# Patient Record
(demographics unavailable — no encounter records)

---

## 2024-12-30 NOTE — ASSESSMENT
[FreeTextEntry1] : ROCHELLE HUTCHINSON is a 74 year old M who presents today Dec 30, 2024 with the above history and the following active issues:  HTN reviewed goal <130/80 Cont coreg and ramipril keep home log, consider addition of thiazide if needed low salt diet   Cardiomyopathy, resolved on med rx for AF EMELINA/RVE Prior study TAA 4.3cm currently wnl  Valvular disease, mild. Beta blocker, ACE inhibitor Monitor thyroid replacement. Treat sleep apnea.  Atrial fibrillation, permanent PVCs, asx Normal EF Reviewed monitors Continue beta blocker for rate control Continue anticoagulation. Monitor hemoglobin and renal function. Reviewed bleeding precautions. Treatment of sleep apnea. Monitor thyroid replacement.  Atherosclerosis. Continue statin therapy. No adverse effects.  Syncope, vasovagal, recurrent Prev, in the setting of COVID, note abnl EKG, repeat showed normal QTc  No episodes recently, discussed avoiding triggers and staying hydrated  Reviewed the pathophysiology of vasovagal syncope. Instructed to avoid triggers such as prolonged standing, heat exposure, alcohol or dehydration and to assume a supine position with legs raised at the onset of symptoms to avoid a traumatic fall. Educated on counterpressure maneuvers such as tensing the arms with clenched fists to reduce lower extremity venous pooling and potentially abort a syncopal episode.  WIll call with results of routine labs requested.   F/U with our office in 6 months for routine cardiovascular care unless otherwise indicated.  Discussed red flag symptoms, which would warrant sooner or emergent medical evaluation. Any questions and concerns were addressed and resolved.   Sincerely,   JOSE M Turner Patients history, testing, medications, and any relative changes to plan of care reviewed with supervising MD: Dr. Octavio Mcclain

## 2024-12-30 NOTE — HISTORY OF PRESENT ILLNESS
[FreeTextEntry1] : ROCHELLE HUTCHINSON  is a 74 year old  M  AF, CMP, HTN, GREER, HL, atherosclerosis, VHD, hypothyroid, syncope History of atrial fibrillation, on Eliquis There is concomitant history of sleep apnea (uses CPAP), hyperlipidemia and hypertension.  Prior cardiomyopathy, possibly rate related.  With up titration of beta-blocker as well as the addition of ACE inhibitor, improvement in LV function.   There is no prior history of a clinical myocardial infarction, coronary revascularization.  There is no history of symptomatic congestive heart failure  Prior episode of vasovagal. He reports feeling unwell p meditation class.  EMS was called. His blood pressure was low. He had sweats and near-syncope.  There was subsequent brisk improvement. He did not go to the emergency room.  then another episode of vasovagal syncope. He was at [employer], tripped and cut his arm, then had meditation class, went to clean his wound which was very bloody then fainted.  EMS called he was discharged from [facility] with no further recommendations.  He then called March 2024 with syncope (again after meditating) and /110 with altered speech. He was referred to ER. Records show stable labs. EKG AF with QTc 583 msec. He was dx with COVID. He was discharged with no changes. Lab k 3.6, cr 0.8, TFT wnl, hg 15.8   No recurrent events since then. He reports feeling well lately.  There is minor dyspnea on exertion. He walks 3 miles a few times per week.  There is no exertional chest pain, pressure or discomfort.  There is no orthopnea.  There are no symptomatic palpitations He is not aware of his AF.  No bleeding issues.  he has been busy with work.  He reports improvement in his stress as his housing situation has settled.  [facility].   BP has improved.  He is seeing endocrine regarding his parathyroid.   Echo 11/2024 E 55% mild LVH, mild MR, RVE, MARZENA seen on prior, aorta 3.9cm Monitor chronic AF avf 80s. Episodes of NSVT up to 4 beats. PVCs  ~ 4-6%. Two monitors worn, the second with 8 beats labeled NSVT appears to be AF with aberrancy on my review.  EKG today 7/8/24 AF LAFB unchanged, and QTcB 442/QTcH 415msec Labs 2/26/24 showed elevated IPTH pt was referred to endocrinology, states also had seen nephro in past unremarkable , , hg 15.3, k 4.1, cr 0.9, LDL 80, a1c 5.3, normal ionized calcium Echo Sept 2023 LVEF 60-65% mild LVH/MR, ao 4.3cm, LAE Nuclear stress test September 2022 mild fixed apical defect suggestive of infarct.  Attenuation artifacts are noted. Abd September 2022 mild atherosclerosis no aneurysm Carotid Doppler September 2022 mild nonobstructive atherosclerosis bilaterally no significant change

## 2025-06-19 NOTE — ADDENDUM
[FreeTextEntry1] : Please note the patient was reviewed with NP Kimberlyn Bello. I was physically present during the service of the patient. I was directly involved in the management plan and recommendations of the care provided to the patient. I personally reviewed the history and physical examination as documented by the NP above.

## 2025-06-19 NOTE — ASSESSMENT
[FreeTextEntry1] : ROCHELLE HUTCHINSON is a 74 year old M who presents today Jun 18, 2025 with the above history and the following active issues:  JARQUIN BLLE edema Recommend addition of diuretic, hctz 25mg in AM for BP and symptoms Reduce salt intake Followup labs including BMP Monitor placed today to evaluate rate control and PVC burden  Obtain 2d echocardiogram to evaluate resting heart structure, valvular function, and LVEF. Obtain a nuclear stress test to evaluate for evidence of myocardial ischemia. Will need to stay on BBl for testing for rate control likely needing a lexiscan study.  Followup after testing  HTN reviewed goal <130/80 Cont coreg and ramipril + thiazide low salt diet   Cardiomyopathy, resolved on med rx for AF EMELINA/RVE Prior study TAA 4.3cm currently wnl  Valvular disease, mild. Beta blocker, ACE inhibitor Monitor thyroid replacement. Treat sleep apnea.  Atrial fibrillation, permanent PVCs Normal EF Reviewed monitors Continue beta blocker for rate control Continue anticoagulation. Monitor hemoglobin and renal function. Reviewed bleeding precautions. Treatment of sleep apnea. Monitor thyroid replacement.  Atherosclerosis. Continue statin therapy. No adverse effects.  Syncope, vasovagal, recurrent Prev, in the setting of COVID, note abnl EKG, repeat showed normal QTc  No episodes recently, discussed avoiding triggers and staying hydrated  Reviewed the pathophysiology of vasovagal syncope. Instructed to avoid triggers such as prolonged standing, heat exposure, alcohol or dehydration and to assume a supine position with legs raised at the onset of symptoms to avoid a traumatic fall. Educated on counterpressure maneuvers such as tensing the arms with clenched fists to reduce lower extremity venous pooling and potentially abort a syncopal episode.  Sincerely,   JOSE M Turner Patient's history, testing, medications, and any relative changes to plan of care reviewed with supervising MD: Dr. Manuelito Rubin

## 2025-06-19 NOTE — HISTORY OF PRESENT ILLNESS
[FreeTextEntry1] : ROCHELLE HUTCHINSON  is a 74 year old  M  AF, CMP, HTN, GREER, HL, atherosclerosis, VHD, hypothyroid, syncope History of atrial fibrillation, on Eliquis There is concomitant history of sleep apnea (uses CPAP), hyperlipidemia and hypertension.  Prior cardiomyopathy, possibly rate related.  With up titration of beta-blocker as well as the addition of ACE inhibitor, improvement in LV function.   There is no prior history of a clinical myocardial infarction, coronary revascularization.  There is no history of symptomatic congestive heart failure  Prior episode of vasovagal. He reports feeling unwell p meditation class.  EMS was called. His blood pressure was low. He had sweats and near-syncope.  There was subsequent brisk improvement. He did not go to the emergency room.  then another episode of vasovagal syncope. He was at [employer], tripped and cut his arm, then had meditation class, went to clean his wound which was very bloody then fainted.  EMS called he was discharged from [facility] with no further recommendations.  He then called March 2024 with syncope (again after meditating) and /110 with altered speech. He was referred to ER. Records show stable labs. EKG AF with QTc 583 msec. He was dx with COVID. He was discharged with no changes. Lab k 3.6, cr 0.8, TFT wnl, hg 15.8   No recurrent events since then.  He walks 3 miles a few times per week.  There is no exertional chest pain, pressure or discomfort.  There is no orthopnea.  There are no symptomatic palpitations He is not aware of his AF.  No bleeding issues.  he has been busy with work.  He reports improvement in his stress as his housing situation has settled.  He plans to remain on the [facility].   He is seeing endocrine regarding his parathyroid. This is being monitored.   Last seen Dec 2024. Interim over the past few months has noticed an increased in JARQUIN with climbing stairs. There is also BLLE edema. He admits to adding table salt to his food at home. His BP is also elevated.  EKG today 6/18/25 shows  bpm, PVCs, LAFB / Lab 12/31/24  hg 16, k 4.1, cr 0.9, ca 10.2, LDL 92, a1c 5.6,   Echo 11/2024 E 55% mild LVH, mild MR, RVE, MARZENA seen on prior, aorta 3.9cm Monitor chronic AF av 80s. Episodes of NSVT up to 4 beats. PVCs  ~ 4-6%. Two monitors worn, the second with 8 beats labeled NSVT appears to be AF with aberrancy on my review.  EKG today 7/8/24 AF LAFB unchanged, and QTcB 442/QTcH 415msec Labs 2/26/24 showed elevated IPTH pt was referred to endocrinology, states also had seen nephro in past unremarkable , , hg 15.3, k 4.1, cr 0.9, LDL 80, a1c 5.3, normal ionized calcium Echo Sept 2023 LVEF 60-65% mild LVH/MR, ao 4.3cm, LAE Nuclear stress test September 2022 mild fixed apical defect suggestive of infarct.  Attenuation artifacts are noted. Abd September 2022 mild atherosclerosis no aneurysm Carotid Doppler September 2022 mild nonobstructive atherosclerosis bilaterally no significant change

## 2025-06-19 NOTE — HISTORY OF PRESENT ILLNESS
[FreeTextEntry1] : ROCHELLE HUTCHINSON  is a 74 year old  M  AF, CMP, HTN, GREER, HL, atherosclerosis, VHD, hypothyroid, syncope History of atrial fibrillation, on Eliquis There is concomitant history of sleep apnea (uses CPAP), hyperlipidemia and hypertension.  Prior cardiomyopathy, possibly rate related.  With up titration of beta-blocker as well as the addition of ACE inhibitor, improvement in LV function.   There is no prior history of a clinical myocardial infarction, coronary revascularization.  There is no history of symptomatic congestive heart failure  Prior episode of vasovagal. He reports feeling unwell p meditation class.  EMS was called. His blood pressure was low. He had sweats and near-syncope.  There was subsequent brisk improvement. He did not go to the emergency room.  then another episode of vasovagal syncope. He was at [employer], tripped and cut his arm, then had meditation class, went to clean his wound which was very bloody then fainted.  EMS called he was discharged from [facility] with no further recommendations.  He then called March 2024 with syncope (again after meditating) and /110 with altered speech. He was referred to ER. Records show stable labs. EKG AF with QTc 583 msec. He was dx with COVID. He was discharged with no changes. Lab k 3.6, cr 0.8, TFT wnl, hg 15.8   No recurrent events since then.  He walks 3 miles a few times per week.  There is no exertional chest pain, pressure or discomfort.  There is no orthopnea.  There are no symptomatic palpitations He is not aware of his AF.  No bleeding issues.  he has been busy with work.  He reports improvement in his stress as his housing situation has settled.  He plans to remain on the [facility].   He is seeing endocrine regarding his parathyroid. This is being monitored.   Last seen Dec 2024. Interim over the past few months has noticed an increased in JARQUIN with climbing stairs. There is also BLLE edema. He admits to adding table salt to his food at home. His BP is also elevated.  EKG today 6/18/25 shows  bpm, PVCs, LAFB / Lab 12/31/24  hg 16, k 4.1, cr 0.9, ca 10.2, LDL 92, a1c 5.6,   Echo 11/2024 E 55% mild LVH, mild MR, RVE, MARZENA seen on prior, aorta 3.9cm Monitor chronic AF av 80s. Episodes of NSVT up to 4 beats. PVCs  ~ 4-6%. Two monitors worn, the second with 8 beats labeled NSVT appears to be AF with aberrancy on my review.  EKG today 7/8/24 AF LAFB unchanged, and QTcB 442/QTcH 415msec Labs 2/26/24 showed elevated IPTH pt was referred to endocrinology, states also had seen nephro in past unremarkable , , hg 15.3, k 4.1, cr 0.9, LDL 80, a1c 5.3, normal ionized calcium Echo Sept 2023 LVEF 60-65% mild LVH/MR, ao 4.3cm, LAE Nuclear stress test September 2022 mild fixed apical defect suggestive of infarct.  Attenuation artifacts are noted. Abd September 2022 mild atherosclerosis no aneurysm Carotid Doppler September 2022 mild nonobstructive atherosclerosis bilaterally no significant change

## 2025-07-24 NOTE — ASSESSMENT
[FreeTextEntry1] : ROCHELLE HUTCHINSON is a 74 year old M who presents today Jul 23, 2025 with the above history and the following active issues:  JARQUIN BLLE edema Improved with addition of diuretic, hctz 25mg in AM as well as reduced salt intake BNP WNL normal EF rate controlled AF stable PVC burden no ischemia on nuclear stress test  Advised to call if any new or worsening symptoms otherwise cont current med rx  HTN reviewed goal <130/80 Cont coreg and ramipril improved + thiazide low salt diet   Cardiomyopathy, resolved on med rx for AF EMELINA/RVE Prior study TAA 4.3cm currently 4cm Valvular disease, mild. Beta blocker, ACE inhibitor Monitor thyroid replacement. Treat sleep apnea.  Atrial fibrillation, permanent PVCs Normal EF Reviewed monitors Continue beta blocker for rate control Continue anticoagulation. Monitor hemoglobin and renal function. Reviewed bleeding precautions. Treatment of sleep apnea. Monitor thyroid replacement.  Atherosclerosis. Continue statin therapy. No adverse effects.  Syncope, vasovagal, recurrent Prev, in the setting of COVID, note abnl EKG, repeat showed normal QTc  No episodes recently, discussed avoiding triggers and staying hydrated  Reviewed the pathophysiology of vasovagal syncope. Instructed to avoid triggers such as prolonged standing, heat exposure, alcohol or dehydration and to assume a supine position with legs raised at the onset of symptoms to avoid a traumatic fall. Educated on counterpressure maneuvers such as tensing the arms with clenched fists to reduce lower extremity venous pooling and potentially abort a syncopal episode.  Sincerely,   JOSE M Turner Patient's history, testing, medications, and any relative changes to plan of care reviewed with supervising MD: Dr. Manuelito Rubin

## 2025-07-24 NOTE — HISTORY OF PRESENT ILLNESS
[FreeTextEntry1] : ROCHELLE HUTCHINSON  is a 74 year old  M   AF, CMP, HTN, GREER, HL, atherosclerosis, VHD, hypothyroid, syncope History of atrial fibrillation, on Eliquis There is concomitant history of sleep apnea (uses CPAP), hyperlipidemia and hypertension.  Prior cardiomyopathy, possibly rate related.  With up titration of beta-blocker as well as the addition of ACE inhibitor, improvement in LV function.   There is no prior history of a clinical myocardial infarction, coronary revascularization.  There is no history of symptomatic congestive heart failure  Prior episode of vasovagal. He reports feeling unwell p meditation class.  EMS was called. His blood pressure was low. He had sweats and near-syncope.  There was subsequent brisk improvement. He did not go to the emergency room.  then another episode of vasovagal syncope. He was at [employer], tripped and cut his arm, then had meditation class, went to clean his wound which was very bloody then fainted.  EMS called he was discharged from [facility] with no further recommendations.  He then called March 2024 with syncope (again after meditating) and /110 with altered speech. He was referred to ER. Records show stable labs. EKG AF with QTc 583 msec. He was dx with COVID. He was discharged with no changes. Lab k 3.6, cr 0.8, TFT wnl, hg 15.8   No recurrent events since then.  He walks 3 miles a few times per week.  There is no exertional chest pain, pressure or discomfort.  There is no orthopnea.  There are no symptomatic palpitations He is not aware of his AF.  No bleeding issues.  he has been busy with work.  He reports improvement in his stress as his housing situation has settled.  He plans to remain on the [facility].   He is seeing endocrine regarding his parathyroid. This is being monitored.   Last seen about a month ago noticed an increased in JARQUIN with climbing stairs. There is also BLLE edema. He admits to adding table salt to his food at home. BP was elevated.  HCTZ was added. There has been improvement in his symptoms. /78 on my exam. CV testing reviewed. today  Testing 6/2025: Lab k 4, cr 1.2,  Echo EF 50-55% mild LVH, EMELINA, aorta 4cm c/w prior Nuclear stress test small fixed apical defect no ischemia 3 day reshmao persistent AF avg 77 bpm, PVC burden 6% EKG 6/18/25 shows  bpm, PVCs, LAFB  Lab 12/31/24  hg 16, k 4.1, cr 0.9, ca 10.2, LDL 92, a1c 5.6,  Monitor chronic AF av 80s. Episodes of NSVT up to 4 beats. PVCs  ~ 4-6%. Two monitors worn, the second with 8 beats labeled NSVT appears to be AF with aberrancy on my review.  Labs 2/26/24 showed elevated IPTH pt was referred to endocrinology, states also had seen nephro in past unremarkable , , hg 15.3, k 4.1, cr 0.9, LDL 80, a1c 5.3, normal ionized calcium Abd September 2022 mild atherosclerosis no aneurysm Carotid Doppler September 2022 mild nonobstructive atherosclerosis bilaterally no significant change